# Patient Record
(demographics unavailable — no encounter records)

---

## 2024-10-23 NOTE — RESULTS/DATA
[FreeTextEntry1] :  PET/CT Skull to Thigh 3/17/22 Impressions: 1. Further decreased size of anterior mediastinal soft tissue mass, with unchanged FDG avidity equal to mediastinal background activity (Deauville 2). 2. Resolution of FDG avidity with either resolution or decreased size of bilateral inguinal lymph nodes. 3. Nonspecific new mildly FDG avid focus in the premolar right mandibular alveolus, likely dental in etiology. Please correlate with dental exam.  PET/CT 9/21/21 IMPRESSION: Since PET/CT January 6, 2021: 1.  Further decreased anterior mediastinal soft tissue mass with FDG avidity equal to mediastinal background (Deauville 2). 2.  New minimally FDG avid symmetric bilateral inguinal lymph nodes, probably benign/reactive.

## 2024-10-23 NOTE — ADDENDUM
[FreeTextEntry1] : Documented by Pato Alvares acting as scribe for Dr. Dunbar on  10/23/2024.   All Medical record entries made by the Scribe were at my, Dr. Dunbar's, direction and personally dictated by me on  10/23/2024. I have reviewed the chart and agree that the record accurately reflects my personal performance of the history, physical exam, assessment and plan. I have also personally directed, reviewed, and agreed with the discharge instructions.

## 2024-10-23 NOTE — HISTORY OF PRESENT ILLNESS
[de-identified] : Agustin remains in complete remission.  Patient doing well, offers no acute complaints. Denies cough, SOB, dyspnea, CP, new or worsening s/s  [de-identified] : Mr. MILLER is a 38 year old male with Hodgkin's lymphoma. Sx originally presented as difficulty breathing. Went to urgent care - CXR revealed anterior mediastinal mass. CT chest - 13.7 cm bulky anterior mediastinal mass.\par  . Denies fevers or pain. Has had some night sweats but not in the past week. Has had some mild congestion for the past 2 weeks. Has lost weight originally intentional.\par  \par  Pathology 3/2/2020:\par  1. Mediastinal mass, core biopsy\par  -Atypical lymphohistiocytic infiltrate, consistent with classical Hodgkin's lymphoma, EBV negative.\par  \par  CT Chest 2/11/2020:\par  -Large multilobulated mass in the superior mediastinum measuring approximately 7.6 x 13.7 x 5.1 cm suspicious for a necrotic neoplasm, lymphoma with adjacent lymphadenopathy. Other etiologies not excluded. Repeat exam with contrast recommended.\par  -Large pericardial effusion.\par  -Nonspecific consolidation at the lower lobe and lingula. \par  \par  Initiated ABVD chemotherapy, and interim restaging PET scan after 2 cycles showed:\par  \par  Since PET/CT March 16, 2020:\par  1. Decreased size of left supraclavicular and anterior mediastinal lymphadenopathy, with residual FDG avidity considerably greater than hepatic background (Deauville 4), consistent with viable lymphoma.\par  2. Increased, now large left pleural effusion.\par  3. Decreased, now small pericardial effusion.\par  4. Widespread bone marrow FDG avidity probably due to recent administration of colony stimulating factors. Clinical correlation suggested.\par  \par  At that point brentuximab was substituted for bleomycin.\par  Agustin continued to do well until July 26 admission to [facility].\par  \par  39 yo M w/ PMH of Hodgkins Lymphoma on chemotherapy who presented with chief \par  complaint of shortness of breath and fever. CT chest was done and showed \par  bilateral ground glass opacities. Patient started on antibiotics and given \par  oxygen supplementation via HFNC. CTA Chest done which showed worsening \par  opacities, non-occlusive SVC pericathether thrombus around the Mediport. \par  Patient started on Lovenox, which patient preferred to be discharged on. ID and \par  Pulmonary was consulted. Abx was escalated to Meropenem and patient was started \par  on Bactrim and steroids for possible PCP. CTS was consulted for bronchoscopy, \par  brochoscopy was then deferred as patient was clinically improving. Patient \par  clinically improved and was weaned off oxygen. Patient had incidental finding \par  of scattered foci on CT chest. NS was consulted for scattered foci on CT. MRI \par  was done once patient was stable and remarkable for disc protusions, prominent \par  epidural venous plexus likely secondary to increased venous collateral flow in \par  the setting of obstructive mass. MRI also had findings of aypical hemangioma vs \par  metastatic disease, will require outpatient monitoring. Patient is currently \par  hemodynamically stable for discharge given discharge instructions regarding \par  medications, follow up. \par  \par  \par   \par  \par   Repeat PET scan following 4 cycles of therapy including the switch to brentuximab:\par  Since PET/CT May 20, 2020:\par  \par  1. Further decreased size and FDG avidity of anterior mediastinal mass. Residual FDG avidity is less greater than mediastinum and less than liver background, Deauville score 3.\par  2. Decreased size with resolved FDG avidity left supraclavicular lymph nodes (Deauville 1).\par  3. Unchanged widespread bone marrow and splenic FDG avidity possibly due to administration of colony stimulating factors. Clinical correlation suggested.\par  4. Unchanged moderate to large left pleural effusion and pericardial effusion.\par  \par   \par  \par  \par  Patient  has completed 6 cycles of chemotherapy, feels well.\par  Followup PET/CT:\par  IMPRESSION:\par  Since PET/CT July 20, 2020:\par  1. New FDG avid right inguinal lymph node, indeterminate, possibly relapsed lymphoma. Suggest further evaluation with ultrasound and tissue sampling for definitive characterization.\par  2. Further decreased size and FDG avidity of anterior mediastinal mass (Deauville 2).\par  3. Resolution of widespread groundglass pulmonary opacities since CT chest July 31, 2020.\par  \par  Completed consolidative RT to mediastinum/neck and supraclav nodes (3000 cGy with Dr. Santiago)\par  A new right inguinal node was concerning, and was negative on biopsy \par  \par  \par  \par

## 2024-10-23 NOTE — HISTORY OF PRESENT ILLNESS
[de-identified] : Agustin remains in complete remission.  Patient doing well, offers no acute complaints. Denies cough, SOB, dyspnea, CP, new or worsening s/s  [de-identified] : Mr. MILLER is a 38 year old male with Hodgkin's lymphoma. Sx originally presented as difficulty breathing. Went to urgent care - CXR revealed anterior mediastinal mass. CT chest - 13.7 cm bulky anterior mediastinal mass.\par  . Denies fevers or pain. Has had some night sweats but not in the past week. Has had some mild congestion for the past 2 weeks. Has lost weight originally intentional.\par  \par  Pathology 3/2/2020:\par  1. Mediastinal mass, core biopsy\par  -Atypical lymphohistiocytic infiltrate, consistent with classical Hodgkin's lymphoma, EBV negative.\par  \par  CT Chest 2/11/2020:\par  -Large multilobulated mass in the superior mediastinum measuring approximately 7.6 x 13.7 x 5.1 cm suspicious for a necrotic neoplasm, lymphoma with adjacent lymphadenopathy. Other etiologies not excluded. Repeat exam with contrast recommended.\par  -Large pericardial effusion.\par  -Nonspecific consolidation at the lower lobe and lingula. \par  \par  Initiated ABVD chemotherapy, and interim restaging PET scan after 2 cycles showed:\par  \par  Since PET/CT March 16, 2020:\par  1. Decreased size of left supraclavicular and anterior mediastinal lymphadenopathy, with residual FDG avidity considerably greater than hepatic background (Deauville 4), consistent with viable lymphoma.\par  2. Increased, now large left pleural effusion.\par  3. Decreased, now small pericardial effusion.\par  4. Widespread bone marrow FDG avidity probably due to recent administration of colony stimulating factors. Clinical correlation suggested.\par  \par  At that point brentuximab was substituted for bleomycin.\par  Agustin continued to do well until July 26 admission to [facility].\par  \par  37 yo M w/ PMH of Hodgkins Lymphoma on chemotherapy who presented with chief \par  complaint of shortness of breath and fever. CT chest was done and showed \par  bilateral ground glass opacities. Patient started on antibiotics and given \par  oxygen supplementation via HFNC. CTA Chest done which showed worsening \par  opacities, non-occlusive SVC pericathether thrombus around the Mediport. \par  Patient started on Lovenox, which patient preferred to be discharged on. ID and \par  Pulmonary was consulted. Abx was escalated to Meropenem and patient was started \par  on Bactrim and steroids for possible PCP. CTS was consulted for bronchoscopy, \par  brochoscopy was then deferred as patient was clinically improving. Patient \par  clinically improved and was weaned off oxygen. Patient had incidental finding \par  of scattered foci on CT chest. NS was consulted for scattered foci on CT. MRI \par  was done once patient was stable and remarkable for disc protusions, prominent \par  epidural venous plexus likely secondary to increased venous collateral flow in \par  the setting of obstructive mass. MRI also had findings of aypical hemangioma vs \par  metastatic disease, will require outpatient monitoring. Patient is currently \par  hemodynamically stable for discharge given discharge instructions regarding \par  medications, follow up. \par  \par  \par   \par  \par   Repeat PET scan following 4 cycles of therapy including the switch to brentuximab:\par  Since PET/CT May 20, 2020:\par  \par  1. Further decreased size and FDG avidity of anterior mediastinal mass. Residual FDG avidity is less greater than mediastinum and less than liver background, Deauville score 3.\par  2. Decreased size with resolved FDG avidity left supraclavicular lymph nodes (Deauville 1).\par  3. Unchanged widespread bone marrow and splenic FDG avidity possibly due to administration of colony stimulating factors. Clinical correlation suggested.\par  4. Unchanged moderate to large left pleural effusion and pericardial effusion.\par  \par   \par  \par  \par  Patient  has completed 6 cycles of chemotherapy, feels well.\par  Followup PET/CT:\par  IMPRESSION:\par  Since PET/CT July 20, 2020:\par  1. New FDG avid right inguinal lymph node, indeterminate, possibly relapsed lymphoma. Suggest further evaluation with ultrasound and tissue sampling for definitive characterization.\par  2. Further decreased size and FDG avidity of anterior mediastinal mass (Deauville 2).\par  3. Resolution of widespread groundglass pulmonary opacities since CT chest July 31, 2020.\par  \par  Completed consolidative RT to mediastinum/neck and supraclav nodes (3000 cGy with Dr. Santiago)\par  A new right inguinal node was concerning, and was negative on biopsy \par  \par  \par  \par

## 2024-10-23 NOTE — ASSESSMENT
[FreeTextEntry1] : Hodgkin's disease now in complete remission following chemotherapy with ABVD and radiation for stage II A presentation, with brentuximab substituted for bleomycin following cycle 2.   - Per NCCN guidelines, imaging will be symptom driven.  - Advised the role for cardiology follow up with periodic stress ECHO - F/U in 1 year